# Patient Record
Sex: FEMALE | Race: WHITE
[De-identification: names, ages, dates, MRNs, and addresses within clinical notes are randomized per-mention and may not be internally consistent; named-entity substitution may affect disease eponyms.]

---

## 2019-12-27 ENCOUNTER — HOSPITAL ENCOUNTER (EMERGENCY)
Dept: HOSPITAL 26 - MED | Age: 34
Discharge: HOME | End: 2019-12-27
Payer: COMMERCIAL

## 2019-12-27 VITALS — DIASTOLIC BLOOD PRESSURE: 87 MMHG | SYSTOLIC BLOOD PRESSURE: 138 MMHG

## 2019-12-27 VITALS — SYSTOLIC BLOOD PRESSURE: 150 MMHG | DIASTOLIC BLOOD PRESSURE: 90 MMHG

## 2019-12-27 VITALS — BODY MASS INDEX: 31.58 KG/M2 | WEIGHT: 185 LBS | HEIGHT: 64 IN

## 2019-12-27 DIAGNOSIS — R20.0: Primary | ICD-10-CM

## 2019-12-27 DIAGNOSIS — F41.9: ICD-10-CM

## 2019-12-27 DIAGNOSIS — F32.9: ICD-10-CM

## 2019-12-27 DIAGNOSIS — N39.0: ICD-10-CM

## 2019-12-27 DIAGNOSIS — R20.2: ICD-10-CM

## 2019-12-27 PROCEDURE — 81002 URINALYSIS NONAUTO W/O SCOPE: CPT

## 2019-12-27 PROCEDURE — 81025 URINE PREGNANCY TEST: CPT

## 2019-12-27 PROCEDURE — 99283 EMERGENCY DEPT VISIT LOW MDM: CPT

## 2019-12-27 PROCEDURE — 96372 THER/PROPH/DIAG INJ SC/IM: CPT

## 2019-12-27 NOTE — NUR
PATIENT ASSESSMENT COMPLETED AT THIS TIME. PATIENT SITTING UP IN BED. GIVEN 
WATER. NO OTHER NEEDS STATED AT THIS TIME. BED IN LOW LOCKED POSITION. SIDE 
RAIL UP ON ONE SIDE.